# Patient Record
Sex: FEMALE | Race: WHITE | NOT HISPANIC OR LATINO | Employment: UNEMPLOYED | URBAN - METROPOLITAN AREA
[De-identification: names, ages, dates, MRNs, and addresses within clinical notes are randomized per-mention and may not be internally consistent; named-entity substitution may affect disease eponyms.]

---
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
## 2023-01-15 NOTE — LETTER
June 24, 2020      Kenia Swain  [address] [place] [address]        Dear Parent or Guardian of Kenia Swain    We are writing to inform you of your child's test results.      Kenia's culture grew E coli a bacteria found in poop and to be sure she wipes from front to back.  She is on Cefdinir which should clear her infection.     Thanks,     Belgica Lucero, APRN, CNP / mkr    Resulted Orders   UA reflex to Microscopic and Culture   Result Value Ref Range    Color Urine Yellow     Appearance Urine Cloudy     Glucose Urine Negative NEG^Negative mg/dL    Bilirubin Urine Negative NEG^Negative    Ketones Urine Trace (A) NEG^Negative mg/dL    Specific Gravity Urine 1.020 1.003 - 1.035    Blood Urine Large (A) NEG^Negative    pH Urine 7.0 5.0 - 7.0 pH    Protein Albumin Urine 100 (A) NEG^Negative mg/dL    Urobilinogen Urine 1.0 0.2 - 1.0 EU/dL    Nitrite Urine Positive (A) NEG^Negative    Leukocyte Esterase Urine Moderate (A) NEG^Negative    Source Midstream Urine    Urine Culture Aerobic Bacterial   Result Value Ref Range    Specimen Description Midstream Urine     Culture Micro >100,000 colonies/mL  Escherichia coli   (A)     Culture Micro       Plus  10,000 to 50,000 colonies/mL  mixed urogenital jp  Susceptibility testing not routinely done     Urine Microscopic   Result Value Ref Range    WBC Urine  (A) OTO5^0 - 5 /HPF    RBC Urine >100 (A) OTO2^O - 2 /HPF    Squamous Epithelial /LPF Urine Moderate (A) FEW^Few /LPF    Bacteria Urine Many (A) NEG^Negative /HPF       If you have any questions or concerns, please call the clinic at the number listed above.       Sincerely,        Belgica Lucero, PNP, APRN CNP                 no no

## 2023-07-02 DIAGNOSIS — N92.0 MENORRHAGIA WITH REGULAR CYCLE: ICD-10-CM

## 2023-07-05 RX ORDER — NORETHINDRONE ACETATE AND ETHINYL ESTRADIOL 1.5; 3 MG/1; UG/1
TABLET ORAL
Qty: 21 TABLET | Refills: 1 | Status: SHIPPED | OUTPATIENT
Start: 2023-07-05 | End: 2023-07-25

## 2023-07-05 NOTE — TELEPHONE ENCOUNTER
Patient calling back and RN relayed provider message below. Patient verbalized good understanding. RN assisted in scheduling visit for 7/25. No further questions or concerns.    NANCY Samuel  [facility] Primary Care Triage

## 2023-07-05 NOTE — TELEPHONE ENCOUNTER
Called and LVM on mobile phone that 2 months worth of medication was sent to pharmacy, however needing an appointment for further refills  Chloé STANLEY    [phone]

## 2023-07-05 NOTE — TELEPHONE ENCOUNTER
Prescription refilled for 2 months.  Please advise Kenia to schedule an appointment for discussion of refills.    Margarita Rose PA-C, MS

## 2023-07-25 ENCOUNTER — OFFICE VISIT (OUTPATIENT)
Dept: PEDIATRICS | Facility: CLINIC | Age: 18
End: 2023-07-25
Payer: COMMERCIAL

## 2023-07-25 VITALS
HEART RATE: 79 BPM | WEIGHT: 110 LBS | TEMPERATURE: 97.9 F | BODY MASS INDEX: 21.3 KG/M2 | DIASTOLIC BLOOD PRESSURE: 67 MMHG | SYSTOLIC BLOOD PRESSURE: 114 MMHG

## 2023-07-25 DIAGNOSIS — Z11.4 SCREENING FOR HIV (HUMAN IMMUNODEFICIENCY VIRUS): ICD-10-CM

## 2023-07-25 DIAGNOSIS — Z11.59 NEED FOR HEPATITIS C SCREENING TEST: ICD-10-CM

## 2023-07-25 DIAGNOSIS — N92.0 MENORRHAGIA WITH REGULAR CYCLE: ICD-10-CM

## 2023-07-25 DIAGNOSIS — N91.2 AMENORRHEA: Primary | ICD-10-CM

## 2023-07-25 DIAGNOSIS — Z11.3 SCREENING FOR STDS (SEXUALLY TRANSMITTED DISEASES): ICD-10-CM

## 2023-07-25 LAB
CLUE CELLS: ABNORMAL
HCG UR QL: NEGATIVE
TRICHOMONAS, WET PREP: ABNORMAL
WBC'S/HIGH POWER FIELD, WET PREP: ABNORMAL
YEAST, WET PREP: ABNORMAL

## 2023-07-25 PROCEDURE — 99214 OFFICE O/P EST MOD 30 MIN: CPT | Performed by: PHYSICIAN ASSISTANT

## 2023-07-25 PROCEDURE — 86803 HEPATITIS C AB TEST: CPT | Performed by: PHYSICIAN ASSISTANT

## 2023-07-25 PROCEDURE — 36415 COLL VENOUS BLD VENIPUNCTURE: CPT | Performed by: PHYSICIAN ASSISTANT

## 2023-07-25 PROCEDURE — 87389 HIV-1 AG W/HIV-1&-2 AB AG IA: CPT | Performed by: PHYSICIAN ASSISTANT

## 2023-07-25 PROCEDURE — 87591 N.GONORRHOEAE DNA AMP PROB: CPT | Performed by: PHYSICIAN ASSISTANT

## 2023-07-25 PROCEDURE — 87491 CHLMYD TRACH DNA AMP PROBE: CPT | Performed by: PHYSICIAN ASSISTANT

## 2023-07-25 PROCEDURE — 87210 SMEAR WET MOUNT SALINE/INK: CPT | Performed by: PHYSICIAN ASSISTANT

## 2023-07-25 PROCEDURE — 81025 URINE PREGNANCY TEST: CPT | Performed by: PHYSICIAN ASSISTANT

## 2023-07-25 RX ORDER — NORETHINDRONE ACETATE AND ETHINYL ESTRADIOL .03; 1.5 MG/1; MG/1
1 TABLET ORAL DAILY
Qty: 84 TABLET | Refills: 4 | Status: SHIPPED | OUTPATIENT
Start: 2023-07-25 | End: 2024-07-26

## 2023-07-25 RX ORDER — SERTRALINE HYDROCHLORIDE 100 MG/1
1 TABLET, FILM COATED ORAL EVERY MORNING
COMMUNITY
Start: 2023-04-11

## 2023-07-25 NOTE — PROGRESS NOTES
Assessment & Plan     Amenorrhea  Advised ultrasound to look at endometrium and other structures.  If normal she can continue on the current OCP to see if this regulates itself over the next 1-2 cycles.  Advised gynecology evaluation if not having a regular cycle or if there are any abnormalities on the ultrasound.   - HCG qualitative urine; Future  - HCG qualitative urine  - US Pelvic Complete with Transvaginal; Future    Menorrhagia with regular cycle  Refilled x12 months.  Follow up in one year with gynecology or FP for pap smear and refills.  - norethindrone-ethinyl estradiol (JUNEL 1.5/30) 1.5-30 MG-MCG tablet; Take 1 tablet by mouth daily    Screening for HIV (human immunodeficiency virus)    - HIV Antigen Antibody Combo; Future  - HIV Antigen Antibody Combo    Need for hepatitis C screening test    - Hepatitis C Screen Reflex to HCV RNA Quant and Genotype; Future  - Hepatitis C Screen Reflex to HCV RNA Quant and Genotype    Screening for STDs (sexually transmitted diseases)    - NEISSERIA GONORRHOEA PCR; Future  - CHLAMYDIA TRACHOMATIS PCR; Future  - Wet prep - lab collect; Future  - NEISSERIA GONORRHOEA PCR  - CHLAMYDIA TRACHOMATIS PCR  - Wet prep - lab collect             Patient Instructions   No evidence of pregnancy, yeast or other infection on the tests today.  Let's do an ultrasound to make sure there is nothing of concern in the uterus.  Continue on the birth control pill for now and if the testing doesn't show anything and you do not have a period return in the next 1-2 cycles, then I would do a consultation with gynecology.     Margarita Rose PA-C  [facility]   Kenia is a 18 year old, presenting for the following health issues:  Recheck Medication        7/25/2023     3:28 PM   Additional Questions   Roomed by kevin   Accompanied by self     History of Present Illness       Reason for visit:  Checkup    She eats 0-1 servings of fruits and vegetables  daily.She consumes 2 sweetened beverage(s) daily.She exercises with enough effort to increase her heart rate 30 to 60 minutes per day.  She exercises with enough effort to increase her heart rate 5 days per week.   She is taking medications regularly.       Would like to discuss her birth control and update on medication .  ==========================================================   Kenia started up sertraline for depression in April 2023.  Feels it has been helpful.  Has a provider with [facility] that she does follow up with for that issue.  No medication refill needed for that today.    OCP- has been on the same medication for a long time.  Has not had a period for 2 months, but does not feel there is a chance of pregnancy.  Has cramping feelings and no period.  Has had some spotting.  Has a history of yeast infection.  No concerns for STI exposure but consents to treatment.  Family history of ovarian cancer as well.  She has not had a significant weight loss or weight gain.  Denies constipation and diarrhea.  No other physical concerns beyond amenorrhea and cramping feelings.       Review of Systems   Constitutional, HEENT, cardiovascular, pulmonary, gi and gu systems are negative, except as otherwise noted.      Objective    /67   Pulse 79   Temp 97.9  F (36.6  C) (Tympanic)   Wt 110 lb (49.9 kg)   BMI 21.30 kg/m    Body mass index is 21.3 kg/m .  Physical Exam   GENERAL: healthy, alert and no distress  RESP: lungs clear to auscultation - no rales, rhonchi or wheezes  CV: regular rate and rhythm, normal S1 S2, no S3 or S4, no murmur, click or rub, no peripheral edema and peripheral pulses strong  ABDOMEN: soft, nontender, no hepatosplenomegaly, no masses and bowel sounds normal    Results for orders placed or performed in visit on 07/25/23   HCG qualitative urine     Status: Normal   Result Value Ref Range    hCG Urine Qualitative Negative Negative   Wet prep - lab collect     Status: Abnormal     Specimen: Vagina; Swab   Result Value Ref Range    Trichomonas Absent Absent    Yeast Absent Absent    Clue Cells Absent Absent    WBCs/high power field 2+ (A) None

## 2023-07-25 NOTE — PATIENT INSTRUCTIONS
No evidence of pregnancy, yeast or other infection on the tests today.  Let's do an ultrasound to make sure there is nothing of concern in the uterus.  Continue on the birth control pill for now and if the testing doesn't show anything and you do not have a period return in the next 1-2 cycles, then I would do a consultation with gynecology.

## 2023-07-26 LAB
C TRACH DNA SPEC QL NAA+PROBE: NEGATIVE
HCV AB SERPL QL IA: NONREACTIVE
HIV 1+2 AB+HIV1 P24 AG SERPL QL IA: NONREACTIVE
N GONORRHOEA DNA SPEC QL NAA+PROBE: NEGATIVE

## 2023-09-16 ENCOUNTER — HEALTH MAINTENANCE LETTER (OUTPATIENT)
Age: 18
End: 2023-09-16

## 2024-07-26 DIAGNOSIS — N92.0 MENORRHAGIA WITH REGULAR CYCLE: ICD-10-CM

## 2024-07-26 RX ORDER — NORETHINDRONE ACETATE AND ETHINYL ESTRADIOL 1.5; 3 MG/1; UG/1
1 TABLET ORAL DAILY
Qty: 84 TABLET | Refills: 0 | Status: SHIPPED | OUTPATIENT
Start: 2024-07-26

## 2024-07-26 NOTE — TELEPHONE ENCOUNTER
Called and LVM that refill had been sent and to schedule an appointment with an adult medicine provider for further refills  Thank you,  Chloé STANLEY    [phone]

## 2024-07-26 NOTE — TELEPHONE ENCOUNTER
I sent a refill of OCP for 3 months. Please advise Kenia she needs to establish care with adult medicine to have a pap smear and discussion of further refills.    Margarita Rose PA-C, MS

## 2024-07-30 NOTE — TELEPHONE ENCOUNTER
2nd attempt  Called and LVM that refill had been sent and to schedule an appointment with an adult medicine provider for further refills  Thank you,  Chloé STANLEY    [phone]

## 2024-10-23 ENCOUNTER — MYC MEDICAL ADVICE (OUTPATIENT)
Dept: PEDIATRICS | Facility: CLINIC | Age: 19
End: 2024-10-23
Payer: COMMERCIAL

## 2025-01-07 DIAGNOSIS — N92.0 MENORRHAGIA WITH REGULAR CYCLE: ICD-10-CM

## 2025-01-07 RX ORDER — NORETHINDRONE ACETATE AND ETHINYL ESTRADIOL 1.5; 3 MG/1; UG/1
1 TABLET ORAL DAILY
Qty: 21 TABLET | Refills: 3 | OUTPATIENT
Start: 2025-01-07

## 2025-01-08 RX ORDER — NORETHINDRONE ACETATE AND ETHINYL ESTRADIOL 1.5; 3 MG/1; UG/1
1 TABLET ORAL DAILY
Qty: 21 TABLET | Refills: 3 | OUTPATIENT
Start: 2025-01-08

## 2025-03-29 ENCOUNTER — HEALTH MAINTENANCE LETTER (OUTPATIENT)
Age: 20
End: 2025-03-29